# Patient Record
Sex: MALE | Race: WHITE
[De-identification: names, ages, dates, MRNs, and addresses within clinical notes are randomized per-mention and may not be internally consistent; named-entity substitution may affect disease eponyms.]

---

## 2017-01-29 ENCOUNTER — HOSPITAL ENCOUNTER (EMERGENCY)
Dept: HOSPITAL 74 - FER | Age: 32
Discharge: HOME | End: 2017-01-29
Payer: COMMERCIAL

## 2017-01-29 VITALS — BODY MASS INDEX: 36.9 KG/M2

## 2017-01-29 VITALS — DIASTOLIC BLOOD PRESSURE: 88 MMHG | HEART RATE: 90 BPM | SYSTOLIC BLOOD PRESSURE: 164 MMHG | TEMPERATURE: 98.1 F

## 2017-01-29 DIAGNOSIS — T14.90: ICD-10-CM
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
## 2017-01-29 DIAGNOSIS — S46.912A: Primary | ICD-10-CM
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
## 2017-01-29 NOTE — PDOC
History of Present Illness





- General


Chief Complaint: Pain


Stated Complaint: left shoulder,neck pain


Time Seen by Provider: 01/29/17 08:45





- History of Present Illness


Initial Comments: 





01/29/17 09:02





31-year-old male with a negative past medical history


He works at the [employer], and does a lot of heavy lifting, lifting heavy boxes


He's had some left shoulder issues in the past from heavy lifting


Patient is complaining of 3 weeks of spontaneous left shoulder pain, which is 

worse with heavy lifting


He denies any radicular symptoms


He denies any fall or direct trauma to the area


He denies any erythema or swelling


He denies any fevers or chills


He denies any associated neck pain or chest pain


He denies any numbness or tingling in his fingers or hand


He denies any other symptoms





Past History





- Past Medical History


Allergies/Adverse Reactions: 


 Allergies











Allergy/AdvReac Type Severity Reaction Status Date / Time


 


No Known Allergies Allergy   Verified 01/29/17 08:44











Home Medications: 


Ambulatory Orders





Ibuprofen [Motrin -] 800 mg PO PRN PRN 01/29/17 


Oxycodone HCl/Acetaminophen [Percocet 5-325 mg Tablet] 1 tab PO Q6H PRN #14 

tablet MDD 5 01/29/17 


Prednisone [Deltasone -] 20 mg PO DAILY #5 tablet 01/29/17 








Diabetes: Yes (borderline)





- Psycho/Social/Smoking Cessation Hx


Anxiety: No


Suicidal Ideation: No


Smoking History: Never smoked


Have you smoked in the past 12 months: No


Information on smoking cessation initiated: No


Hx Alcohol Use: Yes (social)


Drug/Substance Use Hx: No


Substance Use Type: None





*Physical Exam





- Vital Signs


 Last Vital Signs











Temp Pulse Resp BP Pulse Ox


 


 98.1 F   90   20   164/88   99 


 


 01/29/17 08:44  01/29/17 08:44  01/29/17 08:44  01/29/17 08:44  01/29/17 08:44














- Physical Exam


Comments: 





01/29/17 09:03


Physical exam





 Last Vital Signs











Temp Pulse Resp BP Pulse Ox


 


 98.1 F   90   20   164/88   99 


 


 01/29/17 08:44  01/29/17 08:44  01/29/17 08:44  01/29/17 08:44  01/29/17 08:44











Patient is alert and ambulatory and answering questions without difficulty


Head is normocephalic and atraumatic


There is no C-spine T-spine or LS-spine tenderness


Left shoulder-


There is no swelling or erythema


Patient can extend to 90, and then has pain


There is pain with external and internal rotation, and pain with abduction


There is no point tenderness


All distal neurovascular is intact in the left upper extremity


With good pulses and intact sensation


Is full range of motion of the elbow and wrist


No rashes or erythema is noted





Medical Decision Making





- Medical Decision Making





01/29/17 09:05








Most likely left shoulder strain/repetitive use syndrome left shoulder 


patient states that high-dose Motrin is not helping





Sling, rest, elevate, will add prednisone


No clinical indications for x-ray at this time


Will referred orthopedics for follow-up











*DC/Admit/Observation/Transfer


Diagnosis at time of Disposition: 


 Left shoulder strain, Repetitive motion injury





- Discharge Dispostion


Disposition: HOME


Condition at time of disposition: Good





- Referrals


Referrals: 


Kiko Thorpe MD [Staff Physician] - Call tomorrow





- Patient Instructions


Printed Discharge Instructions:  How to Use a Sling, Shoulder Sprain, DI for 

Shoulder Sprain


Additional Instructions: 








Sling as directed, rest


Prednisone as directed for the next few days - start tomorrow as you received 

your first dose here today


Motrin for pain, Percocet for breakthrough pain as directed-do not drive when 

taking this medication


Please follow-up with orthopedics in the next few days-you are being referred 

to Dr. Thorpe's group





Followup with your primary care physician in 24-48 hours


Return immediately if you worsen in any way


Take your medications as directed 








- Post Discharge Activity


Work/School Note:  Back to Work

## 2017-11-30 ENCOUNTER — HOSPITAL ENCOUNTER (EMERGENCY)
Dept: HOSPITAL 74 - FER | Age: 32
Discharge: HOME | End: 2017-11-30
Payer: COMMERCIAL

## 2017-11-30 VITALS — BODY MASS INDEX: 50.1 KG/M2

## 2017-11-30 VITALS — HEART RATE: 87 BPM | TEMPERATURE: 97.6 F | SYSTOLIC BLOOD PRESSURE: 153 MMHG | DIASTOLIC BLOOD PRESSURE: 93 MMHG

## 2017-11-30 DIAGNOSIS — K57.92: Primary | ICD-10-CM

## 2017-11-30 DIAGNOSIS — R73.03: ICD-10-CM

## 2017-11-30 LAB
ALBUMIN SERPL-MCNC: 3.8 G/DL (ref 3.5–5)
ALP SERPL-CCNC: 79 U/L (ref 32–92)
ALT SERPL-CCNC: 23 U/L (ref 10–40)
ANION GAP SERPL CALC-SCNC: 8 MMOL/L (ref 8–16)
AST SERPL-CCNC: 23 U/L (ref 10–42)
BASOPHILS # BLD: 0.3 % (ref 0–2)
BILIRUB SERPL-MCNC: 1.1 MG/DL (ref 0.2–1)
CALCIUM SERPL-MCNC: 9.2 MG/DL (ref 8.4–10.2)
CO2 SERPL-SCNC: 25 MMOL/L (ref 22–28)
COLOR UR: YELLOW
CREAT SERPL-MCNC: 0.7 MG/DL (ref 0.6–1.3)
DEPRECATED RDW RBC AUTO: 11.9 % (ref 11.9–15.9)
EOSINOPHIL # BLD: 1.4 % (ref 0–4.5)
GLUCOSE SERPL-MCNC: 280 MG/DL (ref 74–106)
KETONES UR QL STRIP: (no result)
MCH RBC QN AUTO: 29.2 PG (ref 25.7–33.7)
MCHC RBC AUTO-ENTMCNC: 33.9 G/DL (ref 32–35.9)
MCV RBC: 86.1 FL (ref 80–96)
NEUTROPHILS # BLD: 81.7 % (ref 42.8–82.8)
PH UR: 5 [PH] (ref 4.5–8)
PLATELET # BLD AUTO: 189 K/MM3 (ref 134–434)
PMV BLD: 10.8 FL (ref 7.5–11.1)
PROT SERPL-MCNC: 6.7 G/DL (ref 6.4–8.3)
PROT UR QL STRIP: (no result)
SP GR UR: 1.01 (ref 1–1.02)
UROBILINOGEN UR STRIP-MCNC: 0.2 MG/DL (ref 0.2–1)
WBC # BLD AUTO: 12.9 K/MM3 (ref 4–10.8)

## 2017-11-30 PROCEDURE — 3E0337Z INTRODUCTION OF ELECTROLYTIC AND WATER BALANCE SUBSTANCE INTO PERIPHERAL VEIN, PERCUTANEOUS APPROACH: ICD-10-PCS

## 2017-11-30 PROCEDURE — 3E03329 INTRODUCTION OF OTHER ANTI-INFECTIVE INTO PERIPHERAL VEIN, PERCUTANEOUS APPROACH: ICD-10-PCS

## 2017-11-30 NOTE — PDOC
History of Present Illness





- History of Present Illness


Initial Comments: 





11/30/17 15:24


The patient is a 32 year old male, with no significant past medical history, 

who presents to the emergency department with 2 days of diffuse abdominal pain 

and slight loss of appetite. He reports the pain is worse in his lower abdomen, 

however, complains of pain to his abdomen diffusely. 





He denies chest pain, shortness of breath, headache and dizziness. He denies 

fever, chills, nausea, vomit, diarrhea and constipation. He denies dysuria, 

frequency, urgency and hematuria. 





Allergies: NKDA


Past surgical history: none reported


Social history: none smoker. No EtOH use. 











<Vicki Kim - Last Filed: 11/30/17 15:30>





- General


History Source: Patient





<Erick Santana - Last Filed: 11/30/17 17:39>





- General


Chief Complaint: Pain


Stated Complaint: ABD PAIN


Time Seen by Provider: 11/30/17 13:04





Past History





<Vicki Kim - Last Filed: 11/30/17 15:30>





- Past Medical History


COPD: No


Diabetes: Yes (borderline)





- Suicide/Smoking/Psychosocial Hx


Smoking History: Never smoked


Have you smoked in the past 12 months: No


Information on smoking cessation initiated: No


Hx Alcohol Use: Yes (social)


Drug/Substance Use Hx: No


Substance Use Type: None





<Erick Santana - Last Filed: 11/30/17 17:39>





- Past Medical History


Allergies/Adverse Reactions: 


 Allergies











Allergy/AdvReac Type Severity Reaction Status Date / Time


 


No Known Allergies Allergy   Verified 11/30/17 12:43











Home Medications: 


Ambulatory Orders





Ciprofloxacin HCl [Cipro] 500 mg PO BID #20 tablet 11/30/17 


Metformin HCl 1,000 mg PO BID 11/30/17 


Metronidazole [Flagyl] 375 mg PO TID #30 capsule 11/30/17 











**Review of Systems





- Review of Systems


Able to Perform ROS?: Yes


Constitutional: Yes: See HPI, Loss of Appetite.  No: Chills, Diaphoresis, Fever

, Weakness


HEENTM: No: Symptoms Reported, See HPI, Eye Pain, Blurred Vision, Tearing, 

Recent change in vision, Double Vision, Cataracts, Ear Pain, Ocular Prothesis, 

Ear Discharge, Nose Pain, Nose Congestion, Tinnitus, Nose Bleeding, Hearing Loss

, Throat Pain, Throat Swelling, Mouth Pain, Dental Problems, Difficulty 

Swallowing, Mouth Swelling, Other


Respiratory: Yes: See HPI, Other.  No: Symptoms reported, Cough, Orthopnea, 

Shortness of Breath, SOB with Exertion, SOB at Rest, Stridor, Wheezing, 

Productive cough, Hemoptysis


Cardiac (ROS): Yes: Symptoms Reported, See HPI.  No: Chest Pain, Edema, 

Irregular Heart Rate, Lightheadedness, Palpitations, Syncope, Chest Tightness, 

Other


ABD/GI: Yes: Symptoms Reported, See HPI, Poor Appetite, Abdominal cramping (

diffuse abd pain).  No: Abdominal Distended, Blood Streaked Bowels, Constipated

, Diarrhea, Nausea, Rectal Bleeding, Vomiting, Tarry Stools


: Yes: Symptoms Reported, See HPI.  No: Burning, Dysuria, Discharge, Frequency

, Flank Pain, Hematuria, Incontinence, Pain, Urgency, Testicular Mass, 

Testicular Swelling, Lesions, Testicular Pain, Other


Musculoskeletal: No: Symptoms Reported, See HPI, Back Pain, Gout, Joint Pain, 

Joint Swelling, Muscle Pain, Muscle Weakness, Neck Pain, Joint Stiffness, Other


Integumentary: No: Symptoms Reported, See HPI, Bruising, Change in Color, 

Change in Hair/Nails, Dryness, Erythema, Flushing, Lesions, Lumps, Pallor, 

Pruritus, Rash, Sweating, Other


Neurological: No: Symptoms reported, See HPI, Headache, Numbness, Paresthesia, 

Pre-Existing Deficit, Seizure, Tingling, Tremors, Weakness, Unsteady Gait, 

Ataxia, Dizziness, Other


Psychiatric: No: Anxiety, Depression, Frequent Crying, Stressors, Sleep Pattern 

Change, Emotional Problems, Mood Swings, Change in Appetite, Other


Endocrine: No: Symptoms Reported, See HPI, Excessive Sweating, Flushing, 

Intolerance to Cold, Intolerance to Heat, Increased Hunger, Increased Thirst, 

Increased Urine, Unexplained Weight Gain, Unexplained Weight Loss, Change in 

Weight, Other


Hematologic/Lymphatic: No: Symptoms Reported, See HPI, Anemia, Blood Clots, 

Easy Bleeding, Easy Bruising, Bleeding Diathesis, Lymph Node Abnormalities, 

Swollen Glands, Other


All Other Systems: Reviewed and Negative





<Vicki Kim - Last Filed: 11/30/17 15:30>





*Physical Exam





- Vital Signs


 Last Vital Signs











Temp Pulse Resp BP Pulse Ox


 


 97.6 F   87   20   153/93   97 


 


 11/30/17 12:43  11/30/17 12:43  11/30/17 12:43  11/30/17 12:43  11/30/17 12:43














- Physical Exam


General Appearance: Yes: Appropriately Dressed, Obese


HEENT: positive: EOMI, ALAN, Normal ENT Inspection, Normal Voice, Symmetrical, 

TMs Normal, Pharynx Normal


Neck: positive: Trachea midline, Supple.  negative: Tender


Respiratory/Chest: positive: Lungs Clear, Normal Breath Sounds.  negative: 

Chest Tender, Respiratory Distress, Crackles, Rales, Rhonchi, Wheezing


Cardiovascular: positive: Regular Rhythm, Regular Rate


Gastrointestinal/Abdominal: positive: Normal Bowel Sounds, Tender (mild ttp 

diffuse abdomen), Soft, Protuberent.  negative: Organomegaly, Guarding, Rebound

, Hepatomegaly


Musculoskeletal: positive: Normal Inspection.  negative: CVA Tenderness


Extremity: positive: Normal Capillary Refill, Normal Inspection, Normal Range 

of Motion


Integumentary: positive: Normal Color


Neurologic: positive: CNs II-XII NML intact, Fully Oriented, Alert (AAOx3), 

Normal Mood/Affect, Normal Response, Motor Strength 5/5





<Vicki Kim - Last Filed: 11/30/17 15:30>





- Vital Signs


 Last Vital Signs











Temp Pulse Resp BP Pulse Ox


 


 97.6 F   87   20   153/93   97 


 


 11/30/17 12:43  11/30/17 12:43  11/30/17 12:43  11/30/17 12:43  11/30/17 12:43














<Erick Santana - Last Filed: 11/30/17 17:39>





ED Treatment Course





- LABORATORY


CBC & Chemistry Diagram: 


 11/30/17 13:50





 11/30/17 13:50





- ADDITIONAL ORDERS


Additional order review: 


 Laboratory  Results











  11/30/17





  13:50


 


Sodium  131 L


 


Potassium  4.0


 


Chloride  98


 


Carbon Dioxide  25


 


Anion Gap  8


 


BUN  7  D


 


Creatinine  0.7


 


Creat Clearance w eGFR  > 60


 


Random Glucose  280 H


 


Calcium  9.2


 


Total Bilirubin  1.1 H D


 


AST  23  D


 


ALT  23


 


Alkaline Phosphatase  79  D


 


Total Protein  6.7


 


Albumin  3.8








 











  11/30/17





  13:50


 


RBC  5.22


 


MCV  86.1


 


MCHC  33.9


 


RDW  11.9


 


MPV  10.8


 


Neutrophils %  81.7  D


 


Lymphocytes %  13.4  D


 


Monocytes %  3.2 L


 


Eosinophils %  1.4


 


Basophils %  0.3














- Medications


Given in the ED: 


ED Medications














Discontinued Medications














Generic Name Dose Route Start Last Admin





  Trade Name Mayra  PRN Reason Stop Dose Admin


 


Sodium Chloride  1,000 mls @ 1,000 mls/hr  11/30/17 13:36  11/30/17 13:50





  Normal Saline -  IV  11/30/17 14:35  1,000 mls/hr





  ASDIR STA   Administration














<Vicki Kim - Last Filed: 11/30/17 15:30>





- LABORATORY


CBC & Chemistry Diagram: 


 11/30/17 13:50





 11/30/17 13:50





<Erick Santana - Last Filed: 11/30/17 17:39>





Medical Decision Making





- Medical Decision Making


11/30/17 15:29


The patient was examined upon arrival to ED


The patient is a 32y M who presents with 2 day history fo abdominal pain and 

decreases appetite. Denies n/v/d.


I will run a UA and provide IV fluids for rehydration. 





<Vicki Kim - Last Filed: 11/30/17 15:30>





*DC/Admit/Observation/Transfer





- Attestations


Scribe Attestion: 





11/30/17 15:30





Documentation prepared by Vicki Kim, acting as medical scribe for Erick Santana MD





<Vicki Kim - Last Filed: 11/30/17 15:30>





- Discharge Dispostion


Admit: No





<Erick Santana - Last Filed: 11/30/17 17:39>


Diagnosis at time of Disposition: 


 Diverticulitis








- Discharge Dispostion


Disposition: HOME


Condition at time of disposition: Improved





- Prescriptions


Prescriptions: 


Ciprofloxacin HCl [Cipro] 500 mg PO BID #20 tablet


Metronidazole [Flagyl] 375 mg PO TID #30 capsule





- Referrals


Referrals: 


Anderson De Leon MD [Staff Physician] - 





- Patient Instructions


Printed Discharge Instructions:  DI for Diverticulitis





- Post Discharge Activity


Forms/Work/School Notes:  Back to Work

## 2018-10-14 ENCOUNTER — HOSPITAL ENCOUNTER (EMERGENCY)
Dept: HOSPITAL 74 - FER | Age: 33
Discharge: HOME | End: 2018-10-14
Payer: SELF-PAY

## 2018-10-14 VITALS — HEART RATE: 86 BPM | SYSTOLIC BLOOD PRESSURE: 162 MMHG | TEMPERATURE: 97.4 F | DIASTOLIC BLOOD PRESSURE: 88 MMHG

## 2018-10-14 VITALS — BODY MASS INDEX: 34.8 KG/M2

## 2018-10-14 DIAGNOSIS — R10.9: Primary | ICD-10-CM

## 2018-10-14 DIAGNOSIS — R73.03: ICD-10-CM

## 2018-10-14 LAB
ALBUMIN SERPL-MCNC: 3.8 G/DL (ref 3.5–5)
ALP SERPL-CCNC: 60 U/L (ref 32–92)
ALT SERPL-CCNC: 13 U/L (ref 10–40)
ANION GAP SERPL CALC-SCNC: 6 MMOL/L (ref 8–16)
AST SERPL-CCNC: 14 U/L (ref 10–42)
BASOPHILS # BLD: 0.4 % (ref 0–2)
BILIRUB SERPL-MCNC: 0.7 MG/DL (ref 0.2–1)
BUN SERPL-MCNC: 11 MG/DL (ref 7–18)
CALCIUM SERPL-MCNC: 8.8 MG/DL (ref 8.4–10.2)
CHLORIDE SERPL-SCNC: 101 MMOL/L (ref 98–107)
CO2 SERPL-SCNC: 25 MMOL/L (ref 22–28)
CREAT SERPL-MCNC: 0.8 MG/DL (ref 0.6–1.3)
DEPRECATED RDW RBC AUTO: 12.5 % (ref 11.9–15.9)
EOSINOPHIL # BLD: 1.3 % (ref 0–4.5)
GLUCOSE SERPL-MCNC: 287 MG/DL (ref 74–106)
HCT VFR BLD CALC: 44.8 % (ref 35.4–49)
HGB BLD-MCNC: 14.5 GM/DL (ref 11.7–16.9)
LIPASE SERPL-CCNC: 83 U/L (ref 73–393)
LYMPHOCYTES # BLD: 13.3 % (ref 8–40)
MCH RBC QN AUTO: 28.6 PG (ref 25.7–33.7)
MCHC RBC AUTO-ENTMCNC: 32.4 G/DL (ref 32–35.9)
MCV RBC: 88.2 FL (ref 80–96)
MONOCYTES # BLD AUTO: 3.7 % (ref 3.8–10.2)
NEUTROPHILS # BLD: 81.3 % (ref 42.8–82.8)
PH UR: 5.5 [PH] (ref 4.5–8)
PLATELET # BLD AUTO: 174 K/MM3 (ref 134–434)
PMV BLD: 10.6 FL (ref 7.5–11.1)
POTASSIUM SERPLBLD-SCNC: 3.9 MMOL/L (ref 3.5–5.1)
PROT SERPL-MCNC: 6.5 G/DL (ref 6.4–8.3)
PROT UR QL STRIP: (no result)
RBC # BLD AUTO: 5.08 M/MM3 (ref 4–5.6)
SODIUM SERPL-SCNC: 132 MMOL/L (ref 136–145)
SP GR UR: 1.01 (ref 1.01–1.03)
UROBILINOGEN UR STRIP-MCNC: 0.2 MG/DL (ref 0.2–1)
WBC # BLD AUTO: 9.3 K/MM3 (ref 4–10.8)

## 2018-10-14 PROCEDURE — 3E03329 INTRODUCTION OF OTHER ANTI-INFECTIVE INTO PERIPHERAL VEIN, PERCUTANEOUS APPROACH: ICD-10-PCS

## 2018-10-14 PROCEDURE — 3E0337Z INTRODUCTION OF ELECTROLYTIC AND WATER BALANCE SUBSTANCE INTO PERIPHERAL VEIN, PERCUTANEOUS APPROACH: ICD-10-PCS

## 2018-10-14 PROCEDURE — 3E033NZ INTRODUCTION OF ANALGESICS, HYPNOTICS, SEDATIVES INTO PERIPHERAL VEIN, PERCUTANEOUS APPROACH: ICD-10-PCS

## 2018-10-14 NOTE — PDOC
Attending Attestation





- Resident


Resident Name: Brandi Astorga





- ED Attending Attestation


I have performed the following: I have examined & evaluated the patient, The 

case was reviewed & discussed with the resident, I agree w/resident's findings 

& plan, Exceptions are as noted





- HPI


HPI: 





10/14/18 13:29


Abdominal pain since this morning. Most severe in the left lower quadrant, 

Nausea but no vomiting. One watery bowel movement in the a.m. No fever/chills. 

Had take-out food from [employer] last night, containing beef but it appeared to 

be well cooked.


10/14/18 15:03








- Physicial Exam


PE: 





10/14/18 13:31


Physical exam: Patient is afebrile. Abdomen is nondistended, with normal bowel 

sounds. Completely soft without mass or organomegaly. Mild to moderate 

tenderness left lower quadrant without guarding or rebound. Rectal exam, 

performed by the resident, was normal. Stool guaiac is negative. Remainder 

physical exam is normal


10/14/18 15:04








- Medical Decision Making





10/14/18 13:31


Assessment: Patient's symptoms and physical findings are suggestive of 

recurrent diverticulitis, but acute gastroenteritis is also a possibility  Upon 

review of the prior CT scan from one year ago, findings appeared to be 

nonspecific, consistent with diverticulitis but also with inflammatory colitis. 





Plan: Intravenous fluids, CBC and chemistries, consider imaging if more 

suggestive of diverticulitis or other acute abdominal disease.


10/14/18 15:05


White blood count is 9.3. Remainder of labs are unremarkable except for an 

elevated blood sugar of 267.





Abdominal exam is unchanged.





Dose of intravenous antibiotics was given. Continue on antibiotics as an 

outpatient. Return to hospital if there is fever, increased pain, vomiting, or 

other symptoms. Otherwise follow up with gastroenterologist as directed.

## 2018-10-14 NOTE — PDOC
History of Present Illness





- General


Chief Complaint: Pain


Stated Complaint: ABD PAIN


Time Seen by Provider: 10/14/18 12:40





- History of Present Illness


Initial Comments: 


34yo M with PMH of pre-diabetes, pre-hypertension, and diverticulitis 

presenting with abdominal pain. The pain is most focal to the LLQ, but also 

present in the periumbilical area and RLQ. Patient reports last bowel movement 

was a couple hours prior to arrival which was a loose brown stool without 

blood. Pain is constant and rated at 4-5/10, intermittently increasing in 

severity to 7-8/10. Patient was not taken anything at home for his pain. He has 

felt nausea, but not vomiting. His current symptoms feel similar to that of his 

episode of diverticulitis on 11/30/17 for which he was seen in this ED and was 

treated with ciprofloxacin and flagyl. No history of abdominal surgeries. No 

hematuria or dysuria. Patient endorses fever and chills, but no chest pain or 

shortness of breath. 








Past History





- Past Medical History


Allergies/Adverse Reactions: 


 Allergies











Allergy/AdvReac Type Severity Reaction Status Date / Time


 


No Known Allergies Allergy   Verified 10/14/18 12:27











Home Medications: 


Ambulatory Orders





Ciprofloxacin [Cipro -] 500 mg PO Q12H #14 tablet 10/14/18 


Metronidazole [Flagyl] 500 mg PO Q8H 7 Days #21 capsule 10/14/18 








COPD: No


Diabetes: Yes ("borderline")


GI Disorders: Yes (diverticulitis)


HTN: Yes ("borderline")





- Suicide/Smoking/Psychosocial Hx


Smoking History: Never smoked


Have you smoked in the past 12 months: No


Information on smoking cessation initiated: No


Hx Alcohol Use:  (occasional)


Drug/Substance Use Hx: No


Substance Use Type: None





**Review of Systems





- Review of Systems


Comments:: 


Constitutional: +fever, +chills


HEENT: no throat pain, no dysphagia


Cardiovascular: no chest pain, no palpitations


Respiratory: no cough, no shortness of breath


Gastrointestinal: +abdominal pain, +nausea, no vomiting, +diarrhea, no 

constipation


Genitourinary: no dysuria, no frequency


Musculoskeletal: no myalgia, no arthralgia


Skin: no rash, no itching


Neurologic: no headache, no dizziness








*Physical Exam





- Vital Signs


 Last Vital Signs











Temp Pulse Resp BP Pulse Ox


 


 97.4 F L  86   18   162/88   99 


 


 10/14/18 12:25  10/14/18 12:25  10/14/18 12:25  10/14/18 12:25  10/14/18 12:25














- Physical Exam


Comments: 


General: Awake, alert, and fully oriented, in no acute distress


Head: no signs of trauma


Eyes: EOMI, sclera anicteric


ENT: Moist mucus membranes


Neck: Normal ROM, supple


Lungs: Lungs clear, Normal breath sounds


Cardio: Regular rhythm, S1 and S2 present


Abdomen: Tender to palpation LLQ, and less so in periumbilical area and RLQ. No 

guarding, no rebound, no masses


Rectal: The skin is without erythema or induration.  No external hemorrhoids, 

fissures, skin tags, warts, or discharge. Sphincter tone normal.  There are no 

masses palpated on digital exam.


Extremities: Normal range of motion, Distal pulses present


SKIN: Warm, Dry, normal turgor


Neurologic: Cranial nerves II through XII grossly intact. Normal speech








ED Treatment Course





- LABORATORY


CBC & Chemistry Diagram: 


 10/14/18 13:13





 10/14/18 13:13





Medical Decision Making





- Medical Decision Making


34yo M with PMH of pre-diabetes, pre-hypertension, and diverticulitis 

presenting with abdominal pain. 


-DDX includes but not limited to diverticulitis, kidney stone, urinary tract 

infection, pyelonephritis, pancreatitis, colitis, gastroenteritis


-Labs: no leukocytosis or anemia, lipase negative, FOBT, negative


- UA positive for glucose: instructed patient to follow-up with primary care 

provider to assess for diabetes


-1L NS to hydrate patient who reported episode of diarrhea


-1g Ofirmev for pain


-IV levofloxacin and flagyl


-Though WBC normal, will clinically treat with antibiotics for diverticulitis 

vs colitis


-Discharged with prescription for ciprofloxacin and flagyl





*DC/Admit/Observation/Transfer


Diagnosis at time of Disposition: 


 Abdominal pain








- Discharge Dispostion


Disposition: HOME


Condition at time of disposition: Stable





- Prescriptions


Prescriptions: 


Ciprofloxacin [Cipro -] 500 mg PO Q12H #14 tablet


Metronidazole [Flagyl] 500 mg PO Q8H 7 Days #21 capsule





- Referrals


Referrals: 


Anderson De Leon MD [Primary Care Provider] - 





- Patient Instructions


Printed Discharge Instructions:  DI for Abdominal Pain-Adult


Additional Instructions: 


You came to the ED for abdominal pain. 





Antibiotics (Ciprofloxacin and Metronidazole) prescription sent to your 

pharmacy.





Follow up with your primary care physician in 2-3 days. 





RETURN if: you develop high fevers, severe pain, constipation, intractable 

vomiting, are unable to take your antibiotics, or develop any new or concerning 

symptoms.








- Post Discharge Activity


Forms/Work/School Notes:  Back to Work

## 2018-12-21 ENCOUNTER — HOSPITAL ENCOUNTER (EMERGENCY)
Dept: HOSPITAL 74 - FER | Age: 33
Discharge: HOME | End: 2018-12-21
Payer: COMMERCIAL

## 2018-12-21 VITALS — BODY MASS INDEX: 36.5 KG/M2

## 2018-12-21 VITALS — SYSTOLIC BLOOD PRESSURE: 149 MMHG | HEART RATE: 85 BPM | DIASTOLIC BLOOD PRESSURE: 99 MMHG | TEMPERATURE: 98.5 F
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
## 2018-12-21 DIAGNOSIS — M79.674: Primary | ICD-10-CM
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
## 2018-12-21 PROCEDURE — 2W3UXYZ IMMOBILIZATION OF RIGHT TOE USING OTHER DEVICE: ICD-10-PCS

## 2018-12-21 NOTE — PDOC
History of Present Illness





- General


Chief Complaint: Injury


Stated Complaint: RT GREAT TOE INJURY


Time Seen by Provider: 12/21/18 08:17


History Source: Patient


Exam Limitations: No Limitations





- History of Present Illness


Initial Comments: 





12/21/18 08:24





33-year-old male with history of prediabetes on metformin presents with right 

first great toe pain. Yesterday, the patient was stacking chairs when a stack 

of chairs fell on his first great toe. Patient reports pain at the site as well 

as ecchymosis. However, denies any lacerations or abrasions. No numbness or 

weakness. Reports pain on ambulation. Took no medications.





Past History





- Past Medical History


Allergies/Adverse Reactions: 


 Allergies











Allergy/AdvReac Type Severity Reaction Status Date / Time


 


levofloxacin [From Levaquin] Allergy Intermediate Itching Verified 12/21/18 08:

17











Home Medications: 


Ambulatory Orders





Metformin HCl [Glucophage] 1,000 mg PO BID 12/21/18 








COPD: No


Diabetes: Yes ("borderline")


GI Disorders: Yes (diverticulitis)


HTN: Yes ("borderline")





- Suicide/Smoking/Psychosocial Hx


Smoking History: Never smoked


Have you smoked in the past 12 months: No


Hx Alcohol Use:  (occasional)


Drug/Substance Use Hx: No


Substance Use Type: None





**Review of Systems





- Review of Systems


Able to Perform ROS?: Yes


Comments:: 





12/21/18 08:25





GENERAL/CONSTITUTIONAL: [No fever or chills. No weakness. No weight change.]


HEAD, EYES, EARS, NOSE AND THROAT: [No change in vision. No ear pain or 

discharge. No sore throat.]


CARDIOVASCULAR: [No chest pain or shortness of breath.]


RESPIRATORY: [No cough, wheezing, or hemoptysis.]


GASTROINTESTINAL: [No nausea, vomiting, diarrhea or constipation. No rectal 

bleeding.]


GENITOURINARY: [No dysuria, frequency, or change in urination.]


MUSCULOSKELETAL:  No neck or back pain. +1st right toe pain


SKIN AND BREASTS: [No rash or easy bruising.]


NEUROLOGIC: [No headache, vertigo, loss of consciousness, or loss of sensation.]


PSYCHIATRIC: [No depression or anxiety.]


ENDOCRINE: [No increased thirst. No abnormal weight change.]


HEMATOLOGIC/LYMPHATIC: [No anemia, easy bleeding, or history of blood clots.]


ALLERGIC/IMMUNOLOGIC: [No hives or skin allergy. No latex allergy.]











*Physical Exam





- Physical Exam


Comments: 





12/21/18 08:26





GENERAL: Awake, alert, and fully oriented, in no acute distress


HEAD: No signs of trauma


EYES: PERRLA, EOMI, sclera anicteric, conjunctiva clear


ENT: Auricles normal inspection, hearing grossly normal, nares patent, Moist 

mucosa


NECK: Normal ROM, supple,


EXTREMITIES: Normal range of motion, no edema.  No clubbing or cyanosis. No 

cords, erythema, or tenderness


RLE: 2+ DP Pulse. Sensation intact throughout. No subungal hematoma appreciated 

at 1st toe. 1st toe with TTP at the distal portion of toe with ecchymosis of 

the tissue. Able to flex and extend the digit fully.


NEUROLOGICAL: Cranial nerves II through XII grossly intact.  Normal speech, 

normal gait


SKIN: Warm, Dry, normal turgor, no rashes or lesions noted.





ED Treatment Course





- RADIOLOGY


Radiology Studies Ordered: 














 Category Date Time Status


 


 TOE(S) RIGHT [RAD] Stat Radiology  12/21/18 08:23 Ordered














Medical Decision Making





- Medical Decision Making





12/21/18 08:27





No evidence of subungual hematoma. However, we'll rule out toe fracture. X-ray, 

NSAIDs and reassess.


12/21/18 09:08





Radiograph reviewed by me, pending official radiology read. Questionable 

nondisplaced fracture at base of 1st great toe.


Pt placed with buddy tape and hard sole shoe.


Weight bearing as tolerated.


Leg elevation.


NSAIDS.


Follow up with orthopedics.





I discussed the physical exam findings, ancillary test results and final 

diagnoses with the patient.  I answered all of the patient's questions.  The 

patient was satisfied with the care received and felt comfortable with the 

discharge plan and treatment plan.  The patient will call their primary care 

physician within 24 hours to arrange follow-up and will return to the Emergency 

Department with any new, persistant or worsening symptoms. 





*DC/Admit/Observation/Transfer


Diagnosis at time of Disposition: 


 Toe pain, right








- Discharge Dispostion


Disposition: HOME


Condition at time of disposition: Stable


Decision to Admit order: No





- Referrals


Referrals: 


Jeronimo Roth MD [Staff Physician] - 





- Patient Instructions


Printed Discharge Instructions:  DI for Toe Sprain, DI for Toe Fracture


Additional Instructions: 


Your preliminary xray read shows questionable fracture of your 1st toe.


However, please call back at [phone] for the official read.


Either way, your toe gets treated the same way.


Buddy tape your toes.


Wear the hard sole shoe.


600 mg ibuprofen (motrin) every 6 hours as needed for pain.


Elevate the foot as much as you can when you sleep.


Follow up with orthopedics in 1 to 2 weeks.


Walk as tolerated.


No contact sports.





- Post Discharge Activity

## 2019-03-24 ENCOUNTER — HOSPITAL ENCOUNTER (EMERGENCY)
Dept: HOSPITAL 74 - FER | Age: 34
Discharge: HOME | End: 2019-03-24
Payer: COMMERCIAL

## 2019-03-24 VITALS — HEART RATE: 88 BPM | SYSTOLIC BLOOD PRESSURE: 137 MMHG | DIASTOLIC BLOOD PRESSURE: 87 MMHG | TEMPERATURE: 98.4 F

## 2019-03-24 VITALS — BODY MASS INDEX: 36.9 KG/M2

## 2019-03-24 DIAGNOSIS — K57.92: Primary | ICD-10-CM

## 2019-03-24 DIAGNOSIS — R73.03: ICD-10-CM

## 2019-03-24 LAB
ALBUMIN SERPL-MCNC: 3.5 G/DL (ref 3.4–5)
ALP SERPL-CCNC: 60 U/L (ref 45–117)
ALT SERPL-CCNC: 19 U/L (ref 13–61)
ANION GAP SERPL CALC-SCNC: 10 MMOL/L (ref 8–16)
AST SERPL-CCNC: 16 U/L (ref 15–37)
BASOPHILS # BLD: 0.2 % (ref 0–2)
BILIRUB SERPL-MCNC: 1 MG/DL (ref 0.2–1)
BUN SERPL-MCNC: 21 MG/DL (ref 7–18)
CALCIUM SERPL-MCNC: 8.5 MG/DL (ref 8.5–10)
CHLORIDE SERPL-SCNC: 100 MMOL/L (ref 98–107)
CO2 SERPL-SCNC: 24 MMOL/L (ref 21–32)
CREAT SERPL-MCNC: 0.7 MG/DL (ref 0.55–1.3)
DEPRECATED RDW RBC AUTO: 12.2 % (ref 11.9–15.9)
EOSINOPHIL # BLD: 2.6 % (ref 0–4.5)
GLUCOSE SERPL-MCNC: 274 MG/DL (ref 74–106)
HCT VFR BLD CALC: 44 % (ref 35.4–49)
HGB BLD-MCNC: 14.3 GM/DL (ref 11.7–16.9)
LYMPHOCYTES # BLD: 15.8 % (ref 8–40)
MCH RBC QN AUTO: 29.1 PG (ref 25.7–33.7)
MCHC RBC AUTO-ENTMCNC: 32.5 G/DL (ref 32–35.9)
MCV RBC: 89.5 FL (ref 80–96)
MONOCYTES # BLD AUTO: 5 % (ref 3.8–10.2)
NEUTROPHILS # BLD: 76.4 % (ref 42.8–82.8)
PLATELET # BLD AUTO: 170 K/MM3 (ref 134–434)
PMV BLD: 9.9 FL (ref 7.5–11.1)
POTASSIUM SERPLBLD-SCNC: 3.9 MMOL/L (ref 3.5–5.1)
PROT SERPL-MCNC: 6.2 G/DL (ref 6.4–8.2)
RBC # BLD AUTO: 4.92 M/MM3 (ref 4–5.6)
SODIUM SERPL-SCNC: 134 MMOL/L (ref 136–145)
WBC # BLD AUTO: 10.2 K/MM3 (ref 4–10.8)

## 2019-03-24 PROCEDURE — 3E0337Z INTRODUCTION OF ELECTROLYTIC AND WATER BALANCE SUBSTANCE INTO PERIPHERAL VEIN, PERCUTANEOUS APPROACH: ICD-10-PCS

## 2019-03-24 PROCEDURE — 3E033NZ INTRODUCTION OF ANALGESICS, HYPNOTICS, SEDATIVES INTO PERIPHERAL VEIN, PERCUTANEOUS APPROACH: ICD-10-PCS

## 2019-03-24 PROCEDURE — 3E03329 INTRODUCTION OF OTHER ANTI-INFECTIVE INTO PERIPHERAL VEIN, PERCUTANEOUS APPROACH: ICD-10-PCS

## 2019-03-24 NOTE — PDOC
Attending Attestation





- Resident


Resident Name: Brandi Astorga





- ED Attending Attestation


I have performed the following: I have examined & evaluated the patient, The 

case was reviewed & discussed with the resident, I agree w/resident's findings 

& plan





- HPI


HPI: 





03/24/19 11:12


34 y/o male with LLQ pain since yesterday. No fever or chills. Denies N/V but 

had some diarrhea a couple of days ago. More constipated now. No fall or 

trauma. Hx of diverticulitis in the past. Feels similar to prior episodes of 

diverticulitis. Patient has had at least 3 episodes of diverticulitis in the 

past.


03/24/19 12:45





03/24/19 12:48








- Physicial Exam


PE: 





03/24/19 11:13


VS stable


HEENT: unremarkable, appears well.


Heart: RRR w/o murmur


Lungs: CTA b/l, no wheezes rhonchi or rales


Abd: LLQ tenderness, no rebound, no RLQ, RUQ or LUQ tenderness +BS


Ext: no C/C/E


Neuro: grossly intact with no focal deficits noted


03/24/19 12:46








- Medical Decision Making





03/24/19 12:46


Pt placed on IVF and Tylenol.


Given Rocephin 1 gram IV and Flagyl 500mg IV


Labs normal except for elevated glucose


Will treat with Augmentin at out patient with follow up with Surgeon and GI


Patient is in agreement with plan, will hol doff on CT abd/pelvis, if worsen 

will return to ER





In agreement with Dr. Astorga, chart and labs and patient reviewed





Dx: LLQ pain, diverticulitis

## 2019-03-24 NOTE — PDOC
History of Present Illness





- General


Chief Complaint: Pain


Stated Complaint: DIVERTICULITIS





- History of Present Illness


Initial Comments: 


32 yo M with PMH of pre-diabetes, pre-hypertension, and diverticulitis 

presenting with abdominal pain. Patient states this episode feels like his 

previous episode of diverticulitis-- this would now be his third exacerbation. 

His last flare-up was in October 2018. Patient has never seen a GI doctor or 

had a colonoscopy. He states this episode started on Thursday/Friday and the 

pain is rated 4-5/10 at baseline with moments of 7/10 pain. He has not taken 

anything at home for his pain. He had diarrhea for the first couple days, but 

his last bowel movement was yesterday morning and it is unusual for the patient 

to be constipated. He has not eaten anything since last night as he is afraid 

of worsening his constipation, but endorses good liquid intake. Endorses minor 

nausea, but no vomiting, No history of abdominal surgeries. Denies fevers, 

chest pain, or shortness of breath. 





PCP: Dr. De Leon 








Past History





- Past Medical History


Allergies/Adverse Reactions: 


 Allergies











Allergy/AdvReac Type Severity Reaction Status Date / Time


 


levofloxacin [From Levaquin] Allergy Intermediate Itching Verified 03/24/19 11:

12











Home Medications: 


Ambulatory Orders





Metformin HCl [Glucophage] 1,000 mg PO BID 12/21/18 


Amoxicillin/Potassium Clav [Amox-Clav 875-125 mg Tablet] 2 each PO BID #28 

tablet 03/24/19 








COPD: No


Diabetes: Yes ("borderline")


GI Disorders: Yes (diverticulitis)


HTN: Yes ("borderline")





- Suicide/Smoking/Psychosocial Hx


Smoking History: Never smoked


Have you smoked in the past 12 months: No


Hx Alcohol Use:  (occasional)


Drug/Substance Use Hx: No


Substance Use Type: None





**Review of Systems





- Review of Systems


Comments:: 


Constitutional: no fever, +chills


HEENT: no throat pain, no dysphagia


Cardiovascular: no chest pain, no palpitations


Respiratory: no cough, no shortness of breath


Gastrointestinal: +abdominal pain, no vomiting


Genitourinary: no dysuria, no frequency


Musculoskeletal: no myalgia, no arthralgia


Skin: no rash, no itching


Neurologic: no headache, no weakness














*Physical Exam





- Physical Exam


Comments: 


General: Awake, alert, and fully oriented, in no acute distress


Head: No signs of trauma


Eyes: EOMI, sclera anicteric


ENT: Dry mucus membranes


Neck: Normal ROM, supple


Lungs: Lungs clear, Normal breath sounds


Cardio: Regular rhythm, S1 and S2 present


Abdomen: Tender to palpation in LLQ, soft, nondistended. No guarding, no rebound

, no masses. No CVA tenderness


Extremities: Normal range of motion, Distal pulses present


SKIN: Warm, Dry, normal turgor


Neurologic: Cranial nerves II through XII grossly intact. Normal speech





ED Treatment Course





- LABORATORY


CBC & Chemistry Diagram: 


 03/24/19 11:50





 03/24/19 11:50





Medical Decision Making





- Medical Decision Making


32 yo M with PMH of pre-diabetes, pre-hypertension, and diverticulitis 

presenting with abdominal pain. 


-DDX includes but not limited to diverticulitis, pyelonephritis, pancreatitis, 

colitis, gastroenteritis, gastritis


CBC, CMP


1g Ofirmev


Patient states he has a sensitivity to levaquin; ordered 1g Rocephin and 500mg 

Flagyl


1L NS


Will reassess


03/24/19 11:53





Patient feels his pain is somewhat alleviated, as it is not going up to 7/10


No leukocytosis or anemia


Though WBC normal, will clinically treat with antibiotics for diverticulitis


Sent outpatient prescription for augmentin


Plan to discharge


03/24/19 12:44











*DC/Admit/Observation/Transfer


Diagnosis at time of Disposition: 


 Diverticulitis








- Discharge Dispostion


Disposition: HOME


Condition at time of disposition: Stable





- Prescriptions


Prescriptions: 


Amoxicillin/Potassium Clav [Amox-Clav 875-125 mg Tablet] 2 each PO BID #28 

tablet





- Referrals


Referrals: 


Anderson De Leon MD [Primary Care Provider] - 


Todd Canela MD [Staff Physician] - 


Chas Mejia MD [Staff Physician] - 





- Patient Instructions


Printed Discharge Instructions:  DI for Diverticulitis


Additional Instructions: 


You came to the ED for abdominal pain. Your history and presentation is 

consistent with diverticulitis. 





Antibiotics prescription sent to your pharmacy.





You can take over-the-counter tylenol for pain. Follow the instructions on the 

medication bottle





Follow up with your primary care physician in 2-3 days. Call and make an 

appointment. Your workup is not complete until you do so. 





Referrals for GI and Surgery have been provided. 





Immediate medical attention is required if you have: high fevers, severe pain, 

constipation, intractable vomiting, are unable to take your antibiotics, or 

develop any new or concerning symptoms. If you think you are having an emergency

, call for emergency medical services or present to the emergency department 

right away. 








- Post Discharge Activity


Forms/Work/School Notes:  Back to Work

## 2019-11-12 ENCOUNTER — HOSPITAL ENCOUNTER (EMERGENCY)
Dept: HOSPITAL 74 - FER | Age: 34
Discharge: HOME | End: 2019-11-12
Payer: COMMERCIAL

## 2019-11-12 VITALS — BODY MASS INDEX: 38.3 KG/M2

## 2019-11-12 VITALS — HEART RATE: 78 BPM | TEMPERATURE: 97.9 F | DIASTOLIC BLOOD PRESSURE: 77 MMHG | SYSTOLIC BLOOD PRESSURE: 158 MMHG

## 2019-11-12 DIAGNOSIS — M51.9: ICD-10-CM

## 2019-11-12 DIAGNOSIS — I10: ICD-10-CM

## 2019-11-12 DIAGNOSIS — Z88.8: ICD-10-CM

## 2019-11-12 DIAGNOSIS — R73.03: ICD-10-CM

## 2019-11-12 DIAGNOSIS — K57.92: Primary | ICD-10-CM

## 2019-11-12 LAB
ALBUMIN SERPL-MCNC: 4.2 G/DL (ref 3.4–5)
ALP SERPL-CCNC: 75 U/L (ref 45–117)
ALT SERPL-CCNC: 22 U/L (ref 13–61)
ANION GAP SERPL CALC-SCNC: 11 MMOL/L (ref 8–16)
AST SERPL-CCNC: 15 U/L (ref 15–37)
BASOPHILS # BLD: 0.3 % (ref 0–2)
BILIRUB SERPL-MCNC: 0.6 MG/DL (ref 0.2–1)
BUN SERPL-MCNC: 7 MG/DL (ref 7–18)
CALCIUM SERPL-MCNC: 9 MG/DL (ref 8.5–10)
CHLORIDE SERPL-SCNC: 99 MMOL/L (ref 98–107)
CO2 SERPL-SCNC: 26 MMOL/L (ref 21–32)
CREAT SERPL-MCNC: 0.7 MG/DL (ref 0.55–1.3)
DEPRECATED RDW RBC AUTO: 11.9 % (ref 11.9–15.9)
EOSINOPHIL # BLD: 0.7 % (ref 0–4.5)
GLUCOSE SERPL-MCNC: 362 MG/DL (ref 74–106)
HCT VFR BLD CALC: 46.9 % (ref 35.4–49)
HGB BLD-MCNC: 15.9 GM/DL (ref 11.7–16.9)
LYMPHOCYTES # BLD: 13 % (ref 8–40)
MCH RBC QN AUTO: 30.4 PG (ref 25.7–33.7)
MCHC RBC AUTO-ENTMCNC: 33.8 G/DL (ref 32–35.9)
MCV RBC: 90 FL (ref 80–96)
MONOCYTES # BLD AUTO: 2.4 % (ref 3.8–10.2)
NEUTROPHILS # BLD: 83.6 % (ref 42.8–82.8)
PLATELET # BLD AUTO: 198 K/MM3 (ref 134–434)
PMV BLD: 9.5 FL (ref 7.5–11.1)
POTASSIUM SERPLBLD-SCNC: 4.3 MMOL/L (ref 3.5–5.1)
PROT SERPL-MCNC: 7.1 G/DL (ref 6.4–8.2)
RBC # BLD AUTO: 5.21 M/MM3 (ref 4–5.6)
SODIUM SERPL-SCNC: 136 MMOL/L (ref 136–145)
WBC # BLD AUTO: 12.3 K/MM3 (ref 4–10.8)

## 2019-11-12 PROCEDURE — 3E033GC INTRODUCTION OF OTHER THERAPEUTIC SUBSTANCE INTO PERIPHERAL VEIN, PERCUTANEOUS APPROACH: ICD-10-PCS

## 2019-11-12 PROCEDURE — 3E0333Z INTRODUCTION OF ANTI-INFLAMMATORY INTO PERIPHERAL VEIN, PERCUTANEOUS APPROACH: ICD-10-PCS

## 2019-11-12 PROCEDURE — 3E03329 INTRODUCTION OF OTHER ANTI-INFECTIVE INTO PERIPHERAL VEIN, PERCUTANEOUS APPROACH: ICD-10-PCS

## 2019-11-12 NOTE — PDOC
Documentation entered by Elissa Luu SCRIBE, acting as scribe for Willem Haas MD.








Willem Haas MD:  This documentation has been prepared by the Jus melendez Adrianna, SCRIBE, under my direction and personally reviewed by me in its 

entirety.  I confirm that the documentation accurately reflects all work, 

treatment, procedures, and medical decision making performed by me.  





History of Present Illness





- General


Chief Complaint: Pain


Stated Complaint: ABD PAIN


Time Seen by Provider: 11/12/19 14:47





- History of Present Illness


Initial Comments: 


The patient is a 34 year old male, with a significant PMH of DM, HTN, 

diverticulitis, and cervical disc herniations, who presents to the ED for 

evaluation of abdominal pain that began this morning. Patient complains of 

sudden-onset diffuse lower abdominal pressure, that he describes as a bloating 

sensation. He endorses associated nausea without vomit. Patient notes his LBM 

was yesterday with some mucus. He notes he has had diverticulitis in the past, 

and his current symptoms are similar to his previous episodes. Patients last 

flare-up of diverticulitis was in March of 2019, and he denies any admission to 

the hospital at that time (resolved with IV antibiotics). He denies history of 

abdominal surgeries.





Allergies: Levofloxacin 


Surgical History: None reported


Social History: Social EtOH use. Denies tobacco or illicit drug use


PCP: Dr. De Leon 








Past History





- Past Medical History


Allergies/Adverse Reactions: 


 Allergies











Allergy/AdvReac Type Severity Reaction Status Date / Time


 


levofloxacin [From Levaquin] Allergy Intermediate Itching Verified 11/12/19 14:

47











Home Medications: 


Ambulatory Orders





Metformin HCl [Glucophage] 1,000 mg PO BID 12/21/18 


Amox-Tr/K Cl [Augmentin 875-125mg Tablet -] 1 tab PO BID #14 tablet 11/12/19 


Oxycodone HCl/Acetaminophen [Percocet  mg Tablet] 1 each PO Q6H PRN 11/12/ 19 


Oxycodone HCl/Acetaminophen [Percocet 5-325 mg Tablet] 1 - 2 tab PO Q4H PRN #20 

tablet MDD 6 11/12/19 


Ramipril 5 mg PO DAILY 11/12/19 








COPD: No


Diabetes: Yes ("borderline")


GI Disorders: Yes (diverticulitis)


HTN: Yes ("borderline")


Other medical history: neck, herniated disk





- Psycho Social/Smoking Cessation Hx


Smoking History: Never smoked


Have you smoked in the past 12 months: No


Information on smoking cessation initiated: No


Hx Alcohol Use:  (occasional)


Drug/Substance Use Hx: No


Substance Use Type: None





**Review of Systems





- Review of Systems


Comments:: 


GENERAL/CONSTITUTIONAL: No fever or chills. No weakness.


HEAD, EYES, EARS, NOSE AND THROAT: No change in vision. No ear pain or 

discharge. No sore throat.


CARDIOVASCULAR: No chest pain or shortness of breath.


RESPIRATORY: No cough, wheezing, or hemoptysis.


GASTROINTESTINAL: +Diffuse lower abdominal pain. +Nausea. +Mucus in stool. No 

diarrhea or constipation.


GENITOURINARY: No dysuria, frequency, or change in urination.


MUSCULOSKELETAL: No joint or muscle swelling or pain. No neck or back pain.


SKIN: No rash


NEUROLOGIC: No headache, vertigo, loss of consciousness, or change in strength/

sensation.


ENDOCRINE: No increased thirst. No abnormal weight change.


HEMATOLOGIC/LYMPHATIC: No anemia, easy bleeding, or history of blood clots.


ALLERGIC/IMMUNOLOGIC: No hives or skin allergy.





*Physical Exam





- Vital Signs


 Last Vital Signs











Temp Pulse Resp BP Pulse Ox


 


 98.7 F   108 H  20   170/113 H  99 


 


 11/12/19 14:45  11/12/19 14:45  11/12/19 14:45  11/12/19 14:45  11/12/19 14:45














- Physical Exam


Comments: 


GENERAL: Awake, alert, and fully oriented, in no acute distress


HEAD: No signs of trauma


EYES: PERRLA, EOMI, sclera anicteric, conjunctiva clear. No pallor or icterus. 


ENT: Auricles normal inspection, hearing grossly normal, nares patent, 

oropharynx clear without exudates. Moist mucosa


NECK: Normal ROM, supple, no lymphadenopathy, JVD, or masses


LUNGS: Breath sounds equal, clear to auscultation bilaterally.  No wheezes, and 

no crackles


HEART: Regular rate and rhythm, normal S1 and S2, no murmurs, rubs or gallops


ABDOMEN: +Mild to moderate tenderness to deep palpation in the lower abdomen, 

most pronounced in the left lower quadrant but also present in the suprapubic 

and right lower quadrant area. Soft, normoactive bowel sounds.  No guarding, no 

rebound.  No masses or hernias. 


GENITOURINARY: No abnormalities. 


EXTREMITIES: Normal range of motion, no edema.  No clubbing or cyanosis. No 

cords, erythema, or tenderness


NEUROLOGICAL: Cranial nerves II through XII grossly intact.  Normal speech, 

normal gait


SKIN: Warm, Dry, normal turgor, no rashes or lesions noted. No pallor or 

icterus.








ED Treatment Course





- LABORATORY


CBC & Chemistry Diagram: 


 11/12/19 15:00





 11/12/19 15:00





Medical Decision Making





- Medical Decision Making





11/12/19 18:15


White blood count 12.3.  Glucose greater than 300.  No other significant 

abnormalities in CBC chemistries are urinalysis





Patient prefers to avoid admission and take oral antibiotics at home, as he has 

done in the past for prior bouts of diverticulitis.  He understands that he 

should return to the ER immediately if the pain gets worse, if there is fever/

chills, nausea, vomiting, or diarrhea.  This could indicate an abscess 

formation in the abdomen.  If stable, he will follow-up with his primary 

physician as directed.





Discharge





- Discharge Information


Problems reviewed: Yes


Clinical Impression/Diagnosis: 


 Diverticulitis





Condition: Stable


Disposition: HOME





- Admission


No





- Additional Discharge Information


Prescriptions: 


Amox-Tr/K Cl [Augmentin 875-125mg Tablet -] 1 tab PO BID #14 tablet


Oxycodone HCl/Acetaminophen [Percocet 5-325 mg Tablet] 1 - 2 tab PO Q4H PRN #20 

tablet MDD 6


 PRN Reason: Pain





- Follow up/Referral


Referrals: 


Anderson De Leon MD [Primary Care Provider] - 2 Days





- Patient Discharge Instructions


Patient Printed Discharge Instructions:  DI for Diverticulitis


Additional Instructions: 


Clear liquid diet until symptoms improve.  Antibiotics as directed.  Return to 

ER if pain is worse or there are any other associated symptoms such as fever, 

chills, nausea, vomiting, diarrhea.  Otherwise follow-up with Dr. De Leon in 2 

days.





- Post Discharge Activity


Work/Back to School Note:  Back to Work

## 2019-11-12 NOTE — PDOC
History of Present Illness





- General


Chief Complaint: Pain


Stated Complaint: ABD PAIN


Time Seen by Provider: 11/12/19 14:47





Past History





- Past Medical History


Allergies/Adverse Reactions: 


 Allergies











Allergy/AdvReac Type Severity Reaction Status Date / Time


 


levofloxacin [From Levaquin] Allergy Intermediate Itching Verified 11/12/19 14:

47











Home Medications: 


Ambulatory Orders





Metformin HCl [Glucophage] 1,000 mg PO BID 12/21/18 








COPD: No


Diabetes: Yes ("borderline")


GI Disorders: Yes (diverticulitis)


HTN: Yes ("borderline")





- Psycho Social/Smoking Cessation Hx


Smoking History: Never smoked


Have you smoked in the past 12 months: No


Hx Alcohol Use:  (occasional)


Drug/Substance Use Hx: No


Substance Use Type: None





Discharge





- Discharge Information


Condition: Stable





- Follow up/Referral


Referrals: 


Anderson De Leon MD [Primary Care Provider] - 





- Patient Discharge Instructions





- Post Discharge Activity

## 2019-11-25 ENCOUNTER — HOSPITAL ENCOUNTER (EMERGENCY)
Dept: HOSPITAL 74 - FER | Age: 34
Discharge: HOME | End: 2019-11-25
Payer: COMMERCIAL

## 2019-11-25 VITALS — DIASTOLIC BLOOD PRESSURE: 100 MMHG | HEART RATE: 87 BPM | SYSTOLIC BLOOD PRESSURE: 149 MMHG

## 2019-11-25 VITALS — BODY MASS INDEX: 38.3 KG/M2

## 2019-11-25 VITALS — TEMPERATURE: 98.2 F

## 2019-11-25 DIAGNOSIS — M54.5: Primary | ICD-10-CM

## 2019-11-25 DIAGNOSIS — Z88.1: ICD-10-CM

## 2019-11-25 LAB
ALBUMIN SERPL-MCNC: 3.9 G/DL (ref 3.4–5)
ALP SERPL-CCNC: 70 U/L (ref 45–117)
ALT SERPL-CCNC: 21 U/L (ref 13–61)
ANION GAP SERPL CALC-SCNC: 7 MMOL/L (ref 8–16)
APPEARANCE UR: CLEAR
AST SERPL-CCNC: 8 U/L (ref 15–37)
BASOPHILS # BLD: 0.3 % (ref 0–2)
BILIRUB SERPL-MCNC: 0.7 MG/DL (ref 0.2–1)
BILIRUB UR STRIP.AUTO-MCNC: NEGATIVE MG/DL
BUN SERPL-MCNC: 14 MG/DL (ref 7–18)
CALCIUM SERPL-MCNC: 8.7 MG/DL (ref 8.5–10.1)
CHLORIDE SERPL-SCNC: 103 MMOL/L (ref 98–107)
CO2 SERPL-SCNC: 27 MMOL/L (ref 21–32)
COLOR UR: YELLOW
CREAT SERPL-MCNC: 0.7 MG/DL (ref 0.55–1.3)
DEPRECATED RDW RBC AUTO: 12.5 % (ref 11.9–15.9)
EOSINOPHIL # BLD: 1.2 % (ref 0–4.5)
GLUCOSE SERPL-MCNC: 229 MG/DL (ref 74–106)
HCT VFR BLD CALC: 43.7 % (ref 35.4–49)
HGB BLD-MCNC: 14.7 GM/DL (ref 11.7–16.9)
KETONES UR QL STRIP: (no result)
LEUKOCYTE ESTERASE UR QL STRIP.AUTO: NEGATIVE
LYMPHOCYTES # BLD: 22.2 % (ref 8–40)
MCH RBC QN AUTO: 29.6 PG (ref 25.7–33.7)
MCHC RBC AUTO-ENTMCNC: 33.7 G/DL (ref 32–35.9)
MCV RBC: 87.9 FL (ref 80–96)
MONOCYTES # BLD AUTO: 4.3 % (ref 3.8–10.2)
NEUTROPHILS # BLD: 72 % (ref 42.8–82.8)
NITRITE UR QL STRIP: NEGATIVE
PH UR: 6 [PH] (ref 5–8)
PLATELET # BLD AUTO: 234 K/MM3 (ref 134–434)
PMV BLD: 9.9 FL (ref 7.5–11.1)
POTASSIUM SERPLBLD-SCNC: 3.9 MMOL/L (ref 3.5–5.1)
PROT SERPL-MCNC: 6.8 G/DL (ref 6.4–8.2)
PROT UR QL STRIP: (no result)
PROT UR QL STRIP: NEGATIVE
RBC # BLD AUTO: 4.97 M/MM3 (ref 4–5.6)
SODIUM SERPL-SCNC: 137 MMOL/L (ref 136–145)
SP GR UR: 1.03 (ref 1.01–1.03)
UROBILINOGEN UR STRIP-MCNC: 0.2 MG/DL (ref 0.2–1)
WBC # BLD AUTO: 13.5 K/MM3 (ref 4–10)

## 2019-11-25 PROCEDURE — 3E0337Z INTRODUCTION OF ELECTROLYTIC AND WATER BALANCE SUBSTANCE INTO PERIPHERAL VEIN, PERCUTANEOUS APPROACH: ICD-10-PCS | Performed by: EMERGENCY MEDICINE

## 2019-11-25 PROCEDURE — 3E0333Z INTRODUCTION OF ANTI-INFLAMMATORY INTO PERIPHERAL VEIN, PERCUTANEOUS APPROACH: ICD-10-PCS | Performed by: EMERGENCY MEDICINE

## 2019-11-25 NOTE — PDOC
*Physical Exam





- Vital Signs


 Last Vital Signs











Temp Pulse Resp BP Pulse Ox


 


 98.2 F   82   16   139/92   99 


 


 11/25/19 05:54  11/25/19 07:45  11/25/19 07:45  11/25/19 07:45  11/25/19 05:54














- Physical Exam


Comments: 





11/25/19 10:03


Musculoskeletal: Reproducible right hip right lower back discomfort





ED Treatment Course





- LABORATORY


CBC & Chemistry Diagram: 


 11/25/19 06:25





 11/25/19 06:22





- ADDITIONAL ORDERS


Additional order review: 


 Laboratory  Results











  11/25/19 11/25/19 11/25/19





  06:25 06:22 06:00


 


Sodium   137 


 


Potassium   3.9 


 


Chloride   103 


 


Carbon Dioxide   27 


 


Anion Gap   7 L 


 


BUN   14.0 


 


Creatinine   0.7 


 


Est GFR (CKD-EPI)AfAm   141.70 


 


Est GFR (CKD-EPI)NonAf   122.26 


 


Random Glucose   229 H 


 


Calcium   8.7 


 


Total Bilirubin   0.7 


 


AST   8 L 


 


ALT   21 


 


Alkaline Phosphatase   70 


 


Total Protein   6.8 


 


Albumin   3.9 


 


Urine Color  Cancelled   Yellow


 


Urine Appearance  Cancelled   Clear


 


Urine pH  Cancelled   6.0


 


Ur Specific Gravity    1.032


 


Urine Protein  Cancelled   Negative


 


Urine Glucose (UA)  Cancelled   2+ H


 


Urine Ketones  Cancelled   Trace H


 


Urine Blood  Cancelled   Negative


 


Urine Nitrite  Cancelled   Negative


 


Urine Bilirubin  Cancelled   Negative


 


Urine Urobilinogen  Cancelled   0.2


 


Ur Leukocyte Esterase  Cancelled   Negative








 











  11/25/19





  06:25


 


RBC  4.97


 


MCV  87.9


 


MCHC  33.7


 


RDW  12.5


 


MPV  9.9


 


Neutrophils %  72.0


 


Lymphocytes %  22.2


 


Monocytes %  4.3


 


Eosinophils %  1.2


 


Basophils %  0.3














- Medications


Given in the ED: 


ED Medications














Discontinued Medications














Generic Name Dose Route Start Last Admin





  Trade Name Freq  PRN Reason Stop Dose Admin


 


Ketorolac Tromethamine  30 mg  11/25/19 06:31  11/25/19 06:25





  Toradol Injection -  IVPUSH  11/25/19 06:32  30 mg





  ONCE ONE   Administration





     





     





     





     


 


Sodium Chloride  1,000 ml  11/25/19 06:12  11/25/19 06:23





  Normal Saline -  IV  11/25/19 06:13  1,000 ml





  ONCE ONE   Administration





     





     





     





     














Medical Decision Making





- Medical Decision Making





11/25/19 10:03


Labs notable for slightly elevated WBC the urinalysis is clean CAT scan of 

patient's abdomen pelvis demonstrates no acute pathology or infectious etiology 

of the patient's discomfort.  No rash consistent with shingles noted at this 

time





We will recommend conservative management NSAIDs rest ice orthopedic follow-up 

patient advised to return to ED for any fever severe worsening symptoms or for 

any concerns.





Findings, the need for follow-up and strict return instructions discussed with 

patient.








Discharge





- Discharge Information


Problems reviewed: Yes


Clinical Impression/Diagnosis: 


Back pain


Qualifiers:


 Back pain location: low back pain Chronicity: acute Back pain laterality: 

right Sciatica presence: unspecified whether sciatica present Qualified Code(s)

: M54.5 - Low back pain





Condition: Stable


Disposition: HOME





- Admission


No





- Follow up/Referral


Referrals: 


Jeronimo Roth MD [Staff Physician] - 





- Patient Discharge Instructions


Patient Printed Discharge Instructions:  Low Back Pain


Additional Instructions: 


Ice affected area 20 minutes on 20 minutes off take 400 mg Motrin every 4-6 

hours for 2 days if no improvement in symptoms follow-up with Dr. Roth 

orthopedics.  Return to ED for any fever severe worsening symptoms or for any 

concerns.





- Post Discharge Activity


Work/Back to School Note:  Back to Work

## 2019-11-25 NOTE — PDOC
History of Present Illness





- General


Chief Complaint: Pain, Acute


Stated Complaint: RIGHT FLANK PAIN RADIATING INTO RLQ


Time Seen by Provider: 11/25/19 06:06





- History of Present Illness


Initial Comments: 





11/25/19 06:18


r lower back pain with occasional extension to RLQ


Timing/Duration: reports: constant, getting worse


Quality: reports: moderate


Abdominal Pain Onset Location: reports: other (r back)


Pain Radiation: reports: no radiation


Activities at Onset: reports: rest


Treatment  Prior to Arrive: worse with: analgesics


Aggravating Factors: worse with: Movement


Alleviating Factors: worse with: None





Past History





- Past Medical History


Allergies/Adverse Reactions: 


 Allergies











Allergy/AdvReac Type Severity Reaction Status Date / Time


 


levofloxacin [From Levaquin] Allergy Intermediate Itching Verified 11/12/19 14:

47











Home Medications: 


Ambulatory Orders





Metformin HCl [Glucophage] 1,000 mg PO BID 12/21/18 


Oxycodone HCl/Acetaminophen [Percocet  mg Tablet] 1 each PO Q6H PRN 11/12/ 19 


Oxycodone HCl/Acetaminophen [Percocet 5-325 mg Tablet] 1 - 2 tab PO Q4H PRN #20 

tablet MDD 6 11/12/19 


Ramipril 5 mg PO DAILY 11/12/19 








COPD: No


Diabetes: Yes ("borderline")


GI Disorders: Yes (diverticulitis)


HTN: Yes ("borderline")





- Psycho Social/Smoking Cessation Hx


Smoking History: Never smoked


Have you smoked in the past 12 months: No


Information on smoking cessation initiated: No


Hx Alcohol Use: No


Drug/Substance Use Hx: No


Substance Use Type: None





Abd/GI Specific PMHX





- Complaint Specific PMHX


Diverticulitis: Yes





**Review of Systems





- Review of Systems


All Other Systems: Reviewed and Negative





*Physical Exam





- Vital Signs


 Last Vital Signs











Temp Pulse Resp BP Pulse Ox


 


 98.2 F   86   16   172/97 H  99 


 


 11/25/19 05:54  11/25/19 05:54  11/25/19 05:54  11/25/19 05:54  11/25/19 05:54














- Physical Exam


General Appearance: Yes: Nourished


HEENT: positive: Normal Voice


Neck: negative: Lymphadenopathy (R), Lymphadenopathy (L)


Respiratory/Chest: positive: Lungs Clear


Cardiovascular: positive: Regular Rhythm


Gastrointestinal/Abdominal: positive: Normal Bowel Sounds.  negative: Tender, 

Distended


Lymphatic: negative: Adenopathy


Musculoskeletal: positive: Normal Inspection.  negative: CVA Tenderness (R), 

CVA Tenderness (L), Decreased Range of Motion


Extremity: positive: Normal Capillary Refill


Integumentary: positive: Normal Color


Neurologic: positive: CNs II-XII NML intact





Medical Decision Making





- Medical Decision Making





11/25/19 06:20


persistent non-positional back pain in this 34 year diabetic with hx of 

recurrent divericulitis


this comes in setting of recent treatment for diverticulitis with amox


ddx broad and includes kidney stone and recurrent diverticulitis


analgesia


check labs, urine


image abd





Discharge





- Discharge Information


Problems reviewed: Yes


Clinical Impression/Diagnosis: 


Back pain


Qualifiers:


 Back pain location: low back pain Chronicity: acute Back pain laterality: 

right Sciatica presence: unspecified whether sciatica present Qualified Code(s)

: M54.5 - Low back pain





Condition: Stable





- Follow up/Referral





- Patient Discharge Instructions





- Post Discharge Activity